# Patient Record
Sex: FEMALE | Race: WHITE
[De-identification: names, ages, dates, MRNs, and addresses within clinical notes are randomized per-mention and may not be internally consistent; named-entity substitution may affect disease eponyms.]

---

## 2017-12-04 ENCOUNTER — HOSPITAL ENCOUNTER (EMERGENCY)
Dept: HOSPITAL 25 - UCEAST | Age: 32
Discharge: HOME | End: 2017-12-04
Payer: COMMERCIAL

## 2017-12-04 VITALS — DIASTOLIC BLOOD PRESSURE: 76 MMHG | SYSTOLIC BLOOD PRESSURE: 123 MMHG

## 2017-12-04 DIAGNOSIS — H92.03: ICD-10-CM

## 2017-12-04 DIAGNOSIS — J02.9: Primary | ICD-10-CM

## 2017-12-04 PROCEDURE — 99212 OFFICE O/P EST SF 10 MIN: CPT

## 2017-12-04 PROCEDURE — G0463 HOSPITAL OUTPT CLINIC VISIT: HCPCS

## 2017-12-04 PROCEDURE — 87651 STREP A DNA AMP PROBE: CPT

## 2017-12-04 NOTE — UC
Throat Pain/Nasal Rambo HPI





- HPI Summary


HPI Summary: 





33 yo WF c/o throat pain and B/L ear pain x 2 days associated with f/c. Works 

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 NO cough.





- History of Current Complaint


Chief Complaint: UCGeneralIllness


Stated Complaint: SORE THROAT EAR PAIN


Time Seen by Provider: 17 10:59


Hx Obtained From: Patient


Hx Last Menstrual Period: 17


Pregnant?: No


Onset/Duration: Sudden Onset


Severity: Mild


Cough: None


Associated Signs & Symptoms: Negative: Sinus Discomfort, Nasal Discharge





- Allergies/Home Medications


Allergies/Adverse Reactions: 


 Allergies











Allergy/AdvReac Type Severity Reaction Status Date / Time


 


Ceepryn Chloride Allergy Severe Anaphylatic Verified 17 10:33





[From Cepacol]   Shock  











Home Medications: 


 Home Medications





Phenylephrine-Doxylamine-Dextr [Vicks Dayquil/Nyquil Cold] 2 tab PO BEDTIME PRN 

17 [History Confirmed 17]











PMH/Surg Hx/FS Hx/Imm Hx





- Surgical History


Surgical History: Yes


Surgery Procedure, Year, and Place:  X2 ; R OOPHORECTOMY





- Family History


Known Family History: 


   Negative: Cardiac Disease, Hypertension, Diabetes





- Social History


Alcohol Use: Occasionally


Alcohol Amount: TWICE/MONTH


Substance Use Type: None


Smoking Status (MU): Never Smoked Tobacco





- Immunization History


Most Recent Influenza Vaccination: Not UTD


Most Recent Tetanus Shot: 


Most Recent Pneumonia Vaccination: n/a





Review of Systems


Constitutional: Negative


Skin: Negative


Eyes: Negative


ENT: Sore Throat, Ear Ache


Respiratory: Negative


Cardiovascular: Negative


Gastrointestinal: Negative


Genitourinary: Negative


Motor: Negative


Neurovascular: Negative


Musculoskeletal: Negative


Neurological: Negative


Psychological: Negative


All Other Systems Reviewed And Are Negative: Yes





Physical Exam


Triage Information Reviewed: Yes


Appearance: Ill-Appearing


Vital Signs: 


 Initial Vital Signs











Temp  37.1 C   17 10:29


 


Pulse  93   17 10:29


 


Resp  16   17 10:29


 


BP  123/76   17 10:29


 


Pulse Ox  99   17 10:29











Vital Signs Reviewed: Yes


Eye Exam: Normal


ENT Exam: Normal


ENT: Positive: Pharyngeal erythema, TMs normal, Tonsillar swelling, Tonsillar 

exudate.  Negative: Nasal congestion, Nasal drainage, TM bulging, TM dull, TM 

red, Hoarse voice, Sinus tenderness


Dental Exam: Normal


Neck exam: Normal


Neck: Positive: 1


Respiratory Exam: Normal


Respiratory: Positive: Lungs clear


Cardiovascular Exam: Normal


Abdominal Exam: Normal


Musculoskeletal Exam: Normal


Neurological Exam: Normal


Psychological Exam: Normal


Skin Exam: Normal





Throat Pain/Nasal Course/Dx





- Differential Dx/Diagnosis


Differential Diagnosis/HQI/PQRI: Otitis Media, Pharyngitis, Tonsillitis


Provider Diagnoses: Pharyngitis, otalgia





Discharge





- Discharge Plan


Condition: Stable


Disposition: HOME


Prescriptions: 


Amoxicillin/Clavulanate TAB* [Augmentin *] 875 mg PO BID 7 Days #14 tab


Patient Education Materials:  Strep Throat (ED)


Referrals: 


No Primary Care Phys,NOPCP [Primary Care Provider] - 


Additional Instructions: 


as tolerated

## 2018-06-15 ENCOUNTER — HOSPITAL ENCOUNTER (EMERGENCY)
Dept: HOSPITAL 25 - UCEAST | Age: 33
Discharge: HOME | End: 2018-06-15
Payer: COMMERCIAL

## 2018-06-15 VITALS — SYSTOLIC BLOOD PRESSURE: 131 MMHG | DIASTOLIC BLOOD PRESSURE: 68 MMHG

## 2018-06-15 DIAGNOSIS — L72.3: Primary | ICD-10-CM

## 2018-06-15 DIAGNOSIS — Z88.1: ICD-10-CM

## 2018-06-15 PROCEDURE — 99212 OFFICE O/P EST SF 10 MIN: CPT

## 2018-06-15 PROCEDURE — G0463 HOSPITAL OUTPT CLINIC VISIT: HCPCS

## 2018-06-15 NOTE — UC
Skin Complaint HPI





- HPI Summary


HPI Summary: 





noticed some swelling under both under arms today--right  less than left also 

right is smaller now than this morning---no fevers chills night sweats weight 

loss





- History of Current Complaint


Chief Complaint: UCGeneralIllness


Time Seen by Provider: 06/15/18 10:14


Stated Complaint: SWOLLEN ARMPIT GLANDS


Hx Obtained From: Patient


Hx Last Menstrual Period: 5/15/18


Pregnant?: No


Onset/Duration: Sudden Onset, Lasting Days - 1


Timing: Constant


Pain Intensity: 1


Pain Scale Used: 0-10 Numeric


Location: Discrete - L>R axilla


Aggravating Factor(s): Nothing


Alleviating Factor(s): Nothing


Associated Signs & Symptoms: Positive: Negative





- Allergy/Home Medications


Allergies/Adverse Reactions: 


 Allergies











Allergy/AdvReac Type Severity Reaction Status Date / Time


 


cetylpyridinium chloride Allergy Severe Anaphylatic Verified 06/15/18 10:09





[From Cepacol]   Shock  














Review of Systems


Constitutional: Negative


Skin: Other - cycts v/s swollen lymph both axilla


Eyes: Negative


ENT: Negative


Respiratory: Negative


Cardiovascular: Negative


Gastrointestinal: Negative


Genitourinary: Negative


Motor: Negative


Neurovascular: Negative


Musculoskeletal: Negative


Neurological: Negative


Psychological: Negative


Is Patient Immunocompromised?: No


All Other Systems Reviewed And Are Negative: Yes





PMH/Surg Hx/FS Hx/Imm Hx


Previously Healthy: Yes





- Surgical History


Surgical History: Yes


Surgery Procedure, Year, and Place:  X2 ; R OOPHORECTOMY





- Family History


Known Family History: 


   Negative: Cardiac Disease, Hypertension, Diabetes





- Social History


Occupation: Employed Full-time


Lives: With Family


Alcohol Use: Occasionally


Alcohol Amount: TWICE/MONTH


Substance Use Type: None


Smoking Status (MU): Never Smoked Tobacco





- Immunization History


Most Recent Influenza Vaccination: Not UTD


Most Recent Tetanus Shot: 


Most Recent Pneumonia Vaccination: n/a





Physical Exam


Triage Information Reviewed: Yes


Appearance: Well-Appearing, No Pain Distress, Well-Nourished


Vital Signs: 


 Initial Vital Signs











Temp  98.6 F   06/15/18 10:09


 


Pulse  57   06/15/18 10:09


 


Resp  16   06/15/18 10:09


 


BP  131/68   06/15/18 10:09


 


Pulse Ox  98   06/15/18 10:09











Vital Signs Reviewed: Yes


Eye Exam: Normal


Eyes: Positive: Conjunctiva Clear


ENT Exam: Normal


ENT: Positive: Normal ENT inspection, Hearing grossly normal.  Negative: Trismus

, Muffled voice, Hoarse voice


Dental Exam: Normal


Neck exam: Normal


Neck: Positive: Supple, Nontender, No Lymphadenopathy


Respiratory Exam: Normal


Respiratory: Positive: Chest non-tender, No respiratory distress, No accessory 

muscle use


Cardiovascular Exam: Normal


Cardiovascular: Positive: Pulses Normal, Brisk Capillary Refill


Musculoskeletal Exam: Normal


Musculoskeletal: Positive: Strength Intact, ROM Intact, No Edema


Neurological Exam: Normal


Neurological: Positive: Alert, Muscle Tone Normal


Psychological Exam: Normal


Psychological: Positive: Normal Response To Family, Age Appropriate Behavior


Skin Exam: Normal


Skin: Positive: rashes





Course/Dx





- Course


Course Of Treatment: avoid deoderant, warm compress, keflex follow with pcp





- Diagnoses


Provider Diagnoses: axilla cysts L>R





Discharge





- Sign-Out/Discharge


Documenting (check all that apply): Discharge/Admit/Transfer





- Discharge Plan


Condition: Stable


Disposition: HOME


Prescriptions: 


Cephalexin CAP* [Keflex CAP*] 500 mg PO QID #28 cap


Patient Education Materials:  Lymphadenopathy (ED), Cyst (ED), Warm Compress or 

Soak (ED)


Referrals: 


Jose Mora MD [Medical Doctor] - 2 Weeks


Mindi Garcia MD [Medical Doctor] - 1 Week





- Billing Disposition and Condition


Condition: STABLE


Disposition: Home

## 2020-02-01 ENCOUNTER — HOSPITAL ENCOUNTER (EMERGENCY)
Dept: HOSPITAL 25 - UCEAST | Age: 35
Discharge: HOME | End: 2020-02-01
Payer: COMMERCIAL

## 2020-02-01 VITALS — SYSTOLIC BLOOD PRESSURE: 126 MMHG | DIASTOLIC BLOOD PRESSURE: 79 MMHG

## 2020-02-01 DIAGNOSIS — Z88.8: ICD-10-CM

## 2020-02-01 DIAGNOSIS — J10.1: Primary | ICD-10-CM

## 2020-02-01 DIAGNOSIS — J02.0: ICD-10-CM

## 2020-02-01 LAB — FLUBV RNA SPEC QL NAA+PROBE: POSITIVE

## 2020-02-01 PROCEDURE — 87651 STREP A DNA AMP PROBE: CPT

## 2020-02-01 PROCEDURE — G0463 HOSPITAL OUTPT CLINIC VISIT: HCPCS

## 2020-02-01 PROCEDURE — 99212 OFFICE O/P EST SF 10 MIN: CPT

## 2020-02-01 NOTE — UC
Throat Pain/Nasal Rambo HPI





- HPI Summary


HPI Summary: 


34-year-old female presenting with nasal congestion, sore throat, bilateral ear 

pain, chills, body aches 2 days.  Unsure of fevers.  Patient also states she 

threw up once last night.  Denies abdominal pain or further nausea and 

vomiting.  Denies shortness of breath and wheezing.  Denies cough.  States she 

took DayQuil this morning for symptom relief.  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








- History of Current Complaint


Chief Complaint: UCGeneralIllness


Stated Complaint: SORE THROAT, EAR PAIN


Hx Obtained From: Patient


Hx Last Menstrual Period: 1/15/2020


Pain Intensity: 8


Pain Scale Used: 0-10 Numeric





- Allergies/Home Medications


Allergies/Adverse Reactions: 


 Allergies











Allergy/AdvReac Type Severity Reaction Status Date / Time


 


cetylpyridinium chloride Allergy Severe Anaphylatic Verified 20 09:39





[From Cepacol]   Shock  














PMH/Surg Hx/FS Hx/Imm Hx


Previously Healthy: Yes





- Surgical History


Surgical History: Yes


Surgery Procedure, Year, and Place:  X2 ; R OOPHORECTOMY





- Family History


Known Family History: 


   Negative: Cardiac Disease, Hypertension, Diabetes





- Social History


Alcohol Use: Occasionally


Alcohol Amount: TWICE/MONTH


Substance Use Type: None


Smoking Status (MU): Never Smoked Tobacco





- Immunization History


Most Recent Influenza Vaccination: Not UTD


Most Recent Tetanus Shot: 


Most Recent Pneumonia Vaccination: n/a





Review of Systems


All Other Systems Reviewed And Are Negative: Yes


Constitutional: Positive: Negative, Chills, Fatigue


ENT: Positive: Sore Throat, Ear Ache - b/l, Sinus Congestion


Respiratory: Positive: Negative.  Negative: Cough


Cardiovascular: Positive: Negative


Gastrointestinal: Positive: Vomiting - x1 last night.  Negative: Abdominal Pain

, Diarrhea, Nausea


Musculoskeletal: Positive: Myalgia


Neurological: Positive: Headache





Physical Exam





- Summary


Physical Exam Summary: 


Vital Signs Reviewed: Yes


A+Ox3, no distress, ill-appearing


Eyes: Conjunctiva Clear


ENT: Hearing grossly normal, TM x 2 clear, +nasal congestion, moist, uvula 

midline, no exudate, +pharyngeal erythema


Neck: Positive: Supple, nontender


Respiratory: Positive: No respiratory distress, No accessory muscle use + CTA 

throughout  no w/r


Cardiovascular: RRR  nl s1, s2  no m/r


Musculoskeletal Exam: CULLEN x 4 without difficulty


Neurological: Positive: Alert


Psychological: Positive: age appropriate behavior


Skin: Positive: no rash, no ecchymosis, cheeks flushed








Vital Signs: 


 Initial Vital Signs











Temp  99.5 F   20 09:35


 


Pulse  71   20 09:35


 


Resp  18   20 09:35


 


BP  126/79   20 09:35


 


Pulse Ox  100   20 09:35








 Lab Results











  20 Range/Units





  10:13 10:15 


 


Influenza B (Rapid)   Positive A  (Negative)  


 


Group A Strep Rapid  Positive A   (Negative)  














Throat Pain/Nasal Course/Dx





- Course


Course Of Treatment: 


Positive rapid strep and flu. Educated patient on strep throat and influenza. I 

treated patient with penicillin and tamiflu. Instructed to continue with 

symptomatic treatment and increase fluids. Instructed to follow up with pcp if 

symptoms persist and to go to ED with any new or worsening symptoms. Patient 

voiced understanding and agreed with treatment plan.








- Differential Dx/Diagnosis


Provider Diagnosis: 


 Strep throat, Influenza B








Discharge ED





- Sign-Out/Discharge


Documenting (check all that apply): Patient Departure


All imaging exams completed and their final reports reviewed: No Studies





- Discharge Plan


Condition: Stable


Disposition: HOME


Prescriptions: 


Oseltamivir CAP* [Tamiflu CAP*] 75 mg PO BID #10 cap


Penicillin  MG TAB(NF) [Penicillin  mg Tab] 500 mg PO BID #20 tab


Patient Education Materials:  Strep Throat (ED), Influenza (ED)


Referrals: 


[facility] [Outside] - If Needed


Additional Instructions: 


As discussed, you tested positive for influenza and strep throat today.


Take tamiflu and penicillin as prescribed.


You may continue with motrin and tylenol for fever and pain relief.


You may use over the counter throat sprays or lozenges for symptomatic relief.


Get plenty of rest and increase your fluid intake.


Follow up with your primary care provider if symptoms worsen or do not resolve 

within 10 days.








- Billing Disposition and Condition


Condition: STABLE


Disposition: Home





- Attestation Statements


Provider Attestation: 





This patient was not seen by me.


I was available for consult.


Chart reviewed.





DIXIE